# Patient Record
(demographics unavailable — no encounter records)

---

## 2025-04-02 NOTE — PHYSICAL EXAM
[Well Developed] : well developed [Well Nourished] : well nourished [No Acute Distress] : no acute distress [Normal Conjunctiva] : normal conjunctiva [Normal Venous Pressure] : normal venous pressure [Normal S1, S2] : normal S1, S2 [No Murmur] : no murmur [No Rub] : no rub [No Gallop] : no gallop [Clear Lung Fields] : clear lung fields [Good Air Entry] : good air entry [No Respiratory Distress] : no respiratory distress  [Soft] : abdomen soft [Non Tender] : non-tender [No Masses/organomegaly] : no masses/organomegaly [Normal Bowel Sounds] : normal bowel sounds [Normal Gait] : normal gait [No Edema] : no edema [No Cyanosis] : no cyanosis [No Clubbing] : no clubbing [No Varicosities] : no varicosities [No Rash] : no rash [No Skin Lesions] : no skin lesions [Moves all extremities] : moves all extremities [No Focal Deficits] : no focal deficits [Normal Speech] : normal speech [Alert and Oriented] : alert and oriented [Normal memory] : normal memory [Carotid Bruit] : carotid bruit [de-identified] : ?

## 2025-04-02 NOTE — DISCUSSION/SUMMARY
[EKG obtained to assist in diagnosis and management of assessed problem(s)] : EKG obtained to assist in diagnosis and management of assessed problem(s) [FreeTextEntry1] : EKG performed today is unremarkable.  Dyspnea/Chest pain: Obtain TTE to assess cardiac function and structure. Recommend stress echo to evaluate for exercise-induced symptoms.  Bruit: Obtain bilateral carotid duplex to assess possible bruit heard on exam.   HTN: Currently, the condition is controlled. Continue Losartan. Weight loss with diet modification recommended. Patient advised to avoid strenuous physical activity before stress test complete. Low salt diet reinforced.  Follow up after testing. Patient aware of plan.

## 2025-04-02 NOTE — HISTORY OF PRESENT ILLNESS
[FreeTextEntry1] : Patient is a 52 yr-old female, with pmhx of HTN and Hashimoto's thyroiditis, presenting today for cardiac evaluation. Patient reports occasional midsternal chest "ache" and shortness of breath on exertion, which resolves with rest. Denies any other symptoms at this time. Reports ~15lb weight loss with the use of Zepbound and supplements provided by a wellness clinic. Patient states she is mostly compliant with her Losartan. BP is 132/84 mmHG in office today.   Lab results from 12/2024 were reviewed: LDL 57 HDL 73 HgbA1c 5.5

## 2025-04-02 NOTE — REVIEW OF SYSTEMS
[Dyspnea on exertion] : dyspnea during exertion [Chest Discomfort] : chest discomfort [Negative] : Heme/Lymph [Lower Ext Edema] : no extremity edema [Leg Claudication] : no intermittent leg claudication [Palpitations] : no palpitations [Orthopnea] : no orthopnea [PND] : no PND [Syncope] : no syncope [Cough] : no cough [Wheezing] : no wheezing [Coughing Up Blood] : no hemoptysis [Snoring] : no snoring [FreeTextEntry6] : +shortness of breath on exertion